# Patient Record
Sex: MALE | Race: OTHER | HISPANIC OR LATINO
[De-identification: names, ages, dates, MRNs, and addresses within clinical notes are randomized per-mention and may not be internally consistent; named-entity substitution may affect disease eponyms.]

---

## 2023-07-25 PROBLEM — Z00.00 ENCOUNTER FOR PREVENTIVE HEALTH EXAMINATION: Status: ACTIVE | Noted: 2023-07-25

## 2023-08-15 ENCOUNTER — APPOINTMENT (OUTPATIENT)
Dept: ORTHOPEDIC SURGERY | Facility: CLINIC | Age: 43
End: 2023-08-15
Payer: COMMERCIAL

## 2023-08-15 DIAGNOSIS — M25.561 PAIN IN RIGHT KNEE: ICD-10-CM

## 2023-08-15 PROCEDURE — 99203 OFFICE O/P NEW LOW 30 MIN: CPT

## 2023-08-15 NOTE — HISTORY OF PRESENT ILLNESS
[de-identified] : First-time visit for this 43-year-old gentleman he is here with complaint of right medial knee pain.  Patient states has been having sharp intermittent medial sided right knee pain for about 20 years he recalls a remote accident where he was clipped while playing football patient denies any recent evidence of instability of the knee but has had a sharp medial stinging sensation with rotational movement.  He is here for first-time evaluation.

## 2023-08-15 NOTE — DISCUSSION/SUMMARY
[de-identified] : Patient relates a chronic low-grade discomfort with a recent exacerbation of acute significant pain.  He has clinical exam and history all consistent with a tentative diagnosis of a medial meniscal tear.  We reviewed the pertinent anatomy of the knee utilizing a model and drawings patient was able to appreciate a torn meniscal fragment, symptoms very similar to this over a protracted period of time.  At this point patient is not had any imaging I would like to send him for an MRI to help evaluate him for torn meniscus.  Patient be placed on anti-inflammatory specifically Mobic due to his aspirin allergy.  Reasonable risk and benefits of medication were discussed in detail including potential side effects.  Reviewed his current medication profile appears to be no relative contraindication to its current use.  Today's consultation lasted 40 minutes.

## 2023-08-15 NOTE — PHYSICAL EXAM
[de-identified] : Right knee definite medial joint line tenderness and warmth range of motion 0 to 110 degrees the knee is stable to AP stress varus valgus stress with full extension and 90 degrees of flexion no effusion noted.

## 2023-08-15 NOTE — REASON FOR VISIT
[Initial Visit] : an initial visit for [Knee Pain] : knee pain [FreeTextEntry2] : SUFFERING WITH RIGHT KNEE PAIN FOR 20YRS FROM PLAYING FOOTBALL